# Patient Record
Sex: MALE | Race: WHITE | NOT HISPANIC OR LATINO
[De-identification: names, ages, dates, MRNs, and addresses within clinical notes are randomized per-mention and may not be internally consistent; named-entity substitution may affect disease eponyms.]

---

## 2017-07-19 VITALS
BODY MASS INDEX: 33.74 KG/M2 | HEIGHT: 61.25 IN | DIASTOLIC BLOOD PRESSURE: 52 MMHG | WEIGHT: 181 LBS | SYSTOLIC BLOOD PRESSURE: 120 MMHG

## 2018-05-04 ENCOUNTER — RX RENEWAL (OUTPATIENT)
Age: 23
End: 2018-05-04

## 2018-05-04 DIAGNOSIS — T78.40XA ALLERGY, UNSPECIFIED, INITIAL ENCOUNTER: ICD-10-CM

## 2018-05-04 RX ORDER — EPINEPHRINE 0.3 MG/.3ML
0.3 INJECTION INTRAMUSCULAR
Qty: 2 | Refills: 0 | Status: ACTIVE | COMMUNITY
Start: 2018-05-04 | End: 1900-01-01

## 2018-05-22 ENCOUNTER — RECORD ABSTRACTING (OUTPATIENT)
Age: 23
End: 2018-05-22

## 2018-05-23 ENCOUNTER — APPOINTMENT (OUTPATIENT)
Dept: PEDIATRICS | Facility: CLINIC | Age: 23
End: 2018-05-23

## 2018-05-23 ENCOUNTER — RECORD ABSTRACTING (OUTPATIENT)
Age: 23
End: 2018-05-23

## 2018-05-23 DIAGNOSIS — Z91.010 ALLERGY TO PEANUTS: ICD-10-CM

## 2018-05-23 DIAGNOSIS — E03.9 HYPOTHYROIDISM, UNSPECIFIED: ICD-10-CM

## 2018-07-12 ENCOUNTER — APPOINTMENT (OUTPATIENT)
Dept: PEDIATRICS | Facility: CLINIC | Age: 23
End: 2018-07-12
Payer: COMMERCIAL

## 2018-07-12 VITALS
WEIGHT: 184 LBS | BODY MASS INDEX: 33.86 KG/M2 | SYSTOLIC BLOOD PRESSURE: 118 MMHG | DIASTOLIC BLOOD PRESSURE: 68 MMHG | HEIGHT: 61.75 IN

## 2018-07-12 DIAGNOSIS — Z23 ENCOUNTER FOR IMMUNIZATION: ICD-10-CM

## 2018-07-12 DIAGNOSIS — Z90.89 ACQUIRED ABSENCE OF OTHER ORGANS: ICD-10-CM

## 2018-07-12 DIAGNOSIS — Z87.19 PERSONAL HISTORY OF OTHER DISEASES OF THE DIGESTIVE SYSTEM: ICD-10-CM

## 2018-07-12 DIAGNOSIS — Z00.00 ENCOUNTER FOR GENERAL ADULT MEDICAL EXAMINATION W/OUT ABNORMAL FINDINGS: ICD-10-CM

## 2018-07-12 DIAGNOSIS — Z87.438 PERSONAL HISTORY OF OTHER DISEASES OF MALE GENITAL ORGANS: ICD-10-CM

## 2018-07-12 PROCEDURE — 90471 IMMUNIZATION ADMIN: CPT

## 2018-07-12 PROCEDURE — 81003 URINALYSIS AUTO W/O SCOPE: CPT | Mod: QW

## 2018-07-12 PROCEDURE — 99395 PREV VISIT EST AGE 18-39: CPT | Mod: 25

## 2018-07-12 PROCEDURE — 90715 TDAP VACCINE 7 YRS/> IM: CPT

## 2018-07-12 RX ORDER — MOMETASONE FUROATE 1 MG/G
0.1 CREAM TOPICAL TWICE DAILY
Qty: 60 | Refills: 0 | Status: ACTIVE | COMMUNITY
Start: 2018-07-12 | End: 1900-01-01

## 2018-07-12 NOTE — PHYSICAL EXAM
[General Appearance - Alert] : alert [General Appearance - Well Developed] : interactive [General Appearance - Well-Appearing] : well appearing [General Appearance - In No Acute Distress] : in no acute distress [Appearance Of Head] : the head was normocephalic [Sclera] : the conjunctiva were normal [Outer Ear] : the ears and nose were normal in appearance [Both Tympanic Membranes Were Examined] : both tympanic membranes were normal [Hearing Threshold Finger Rub Not [place]] : grossly normal hearing [Nasal Cavity] : the nasal mucosa and septum were normal [Examination Of The Oral Cavity] : the teeth, gums, and palate were normal [Oropharynx] : the oropharynx was normal  [Neck Cervical Mass (___cm)] : no neck mass was observed [Thyroid Diffuse Enlargement] : the thyroid was not enlarged [Thyroid Nodule] : there were no palpable thyroid nodules [Respiration, Rhythm And Depth] : normal respiratory rhythm and effort [Auscultation Breath Sounds / Voice Sounds] : clear bilateral breath sounds [Heart Rate And Rhythm] : heart rate and rhythm were normal [Heart Sounds] : normal S1 and S2 [Murmurs] : no murmurs [Bowel Sounds] : normal bowel sounds [Abdomen Soft] : soft [Abdomen Tenderness] : non-tender [Abdominal Distention] : nondistended [] : no hepato-splenomegaly [No Visual Abnormalities] : no visible abnormailities [Cranial Nerves] : cranial nerves 2-12 were intact [Deep Tendon Reflexes (DTR)] : deep tendon reflexes were 2+ and symmetric [Generalized Lymph Node Enlargement] : no lymphadenopathy [Initial Inspection: Infant Active And Alert] : active and alert [Demonstrated Behavior - Infant Nonreactive To Parents] : interactive [Mood] : mood and affect were appropriate for age [Attitude Unable To Engage] : normal social engagement [FreeTextEntry1] : Down Syndrome [FreeTextEntry2] : Deferred

## 2018-07-12 NOTE — HISTORY OF PRESENT ILLNESS
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

## 2018-07-12 NOTE — DISCUSSION/SUMMARY
[Normal Growth] : growth [Normal Development] : development  [None] : No known medical problems [No Elimination Concerns] : elimination [No Feeding Concerns] : feeding [No Skin Concerns] : skin [Normal Sleep Pattern] : sleep [Physical Growth and Development] : physical growth and development [Social and Academic Competence] : social and academic competence [Emotional Well-Being] : emotional well-being [Risk Reduction] : risk reduction [Violence and Injury Prevention] : violence and injury prevention [No Medications] : ~He/She~ is not on any medications [Patient] : patient [Parent/Guardian] : parent/guardian [FreeTextEntry1] : 23 y o w Down Syndrome for HM and immunization \par PE obviously overweight, dulled appearance w Down Syndrome stigmata, friendly \par exam is essentially normal with above described SX\par recommend more exercise(goes to [facility])

## 2019-02-03 PROBLEM — T78.40XA ALLERGY: Status: ACTIVE | Noted: 2018-05-04

## 2019-04-05 ENCOUNTER — APPOINTMENT (OUTPATIENT)
Dept: PEDIATRICS | Facility: CLINIC | Age: 24
End: 2019-04-05
Payer: COMMERCIAL

## 2019-04-05 VITALS — WEIGHT: 182 LBS | TEMPERATURE: 97.9 F

## 2019-04-05 DIAGNOSIS — J40 BRONCHITIS, NOT SPECIFIED AS ACUTE OR CHRONIC: ICD-10-CM

## 2019-04-05 DIAGNOSIS — Q90.9 DOWN SYNDROME, UNSPECIFIED: ICD-10-CM

## 2019-04-05 DIAGNOSIS — R68.89 OTHER GENERAL SYMPTOMS AND SIGNS: ICD-10-CM

## 2019-04-05 LAB — FLUAV SPEC QL CULT: NORMAL

## 2019-04-05 PROCEDURE — 87804 INFLUENZA ASSAY W/OPTIC: CPT | Mod: QW

## 2019-04-05 PROCEDURE — 99214 OFFICE O/P EST MOD 30 MIN: CPT

## 2019-04-05 RX ORDER — AZITHROMYCIN 200 MG/5ML
200 POWDER, FOR SUSPENSION ORAL
Qty: 65 | Refills: 0 | Status: ACTIVE | COMMUNITY
Start: 2019-04-05 | End: 1900-01-01

## 2019-04-05 NOTE — RISK ASSESSMENT
[Eats meals with family] : eats meals with family [Eats regular meals including adequate fruits and vegetables] : eats regular meals including adequate fruits and vegetables [Home is free of violence] : home is free of violence [Uses tobacco] : does not use tobacco [Uses drugs] : does not use drugs  [Drinks alcohol] : does not drink alcohol

## 2019-04-05 NOTE — PHYSICAL EXAM
[No Acute Distress] : no acute distress [Rhonchi] : rhonchi [Maurilio: ____] : Maurilio [unfilled] [Circumcised] : circumcised [No Abnormal Lymph Nodes Palpated] : no abnormal lymph nodes palpated [Moves All Extremities x 4] : moves all extremities x4 [Normotonic] : normotonic [NL] : warm [Warm] : warm [FreeTextEntry1] : afebrile [de-identified] : PND [FreeTextEntry7] : Rhonchi L base post, NO Wheeze nor Rales, no retractions

## 2019-04-05 NOTE — DISCUSSION/SUMMARY
[FreeTextEntry1] : 22 yo w Down Syndrome\par fever x 3 days (TMax 102+) and productive cough\par PE afebrile non toxic, friendly, cooperative\par PND \par Chest Rhonchi L base\par Rapid Flu POS\par elect to treat W Azithromycin 500 x 5 for chest congestion, \par humidifier, Fluids, T&H, C-Soup NSAIDs\par ques ans\par sick x 3 days Tamiflu superfluous

## 2019-04-05 NOTE — HISTORY OF PRESENT ILLNESS
[de-identified] : fever [FreeTextEntry6] : @# yo w Down Syndrome\par fever x 3 day T max 102+, coughing\par expectorate yellow green phlegm

## 2025-07-05 ENCOUNTER — APPOINTMENT (OUTPATIENT)
Dept: ULTRASOUND IMAGING | Facility: CLINIC | Age: 30
End: 2025-07-05
Payer: COMMERCIAL

## 2025-07-05 PROCEDURE — 76700 US EXAM ABDOM COMPLETE: CPT
